# Patient Record
Sex: MALE | URBAN - METROPOLITAN AREA
[De-identification: names, ages, dates, MRNs, and addresses within clinical notes are randomized per-mention and may not be internally consistent; named-entity substitution may affect disease eponyms.]

---

## 2023-10-06 ENCOUNTER — OFFICE VISIT (OUTPATIENT)
Dept: URBAN - METROPOLITAN AREA TELEHEALTH 2 | Facility: TELEHEALTH | Age: 63
End: 2023-10-06
Payer: COMMERCIAL

## 2023-10-06 DIAGNOSIS — Z12.11 COLON CANCER SCREENING: ICD-10-CM

## 2023-10-06 DIAGNOSIS — K21.9 GERD WITHOUT ESOPHAGITIS: ICD-10-CM

## 2023-10-06 DIAGNOSIS — K52.89 (LYMPHOCYTIC) MICROSCOPIC COLITIS: ICD-10-CM

## 2023-10-06 PROCEDURE — 99203 OFFICE O/P NEW LOW 30 MIN: CPT | Performed by: INTERNAL MEDICINE

## 2023-10-06 RX ORDER — MELATONIN 3 MG
1 TABLET AT BEDTIME AS NEEDED TABLET ORAL ONCE A DAY
Status: ACTIVE | COMMUNITY
Start: 2023-10-15

## 2023-10-06 RX ORDER — SODIUM, POTASSIUM,MAG SULFATES 17.5-3.13G
354ML SOLUTION, RECONSTITUTED, ORAL ORAL
Qty: 345 MILLILITER | Refills: 0 | OUTPATIENT
Start: 2023-10-15 | End: 2023-10-16

## 2023-10-06 RX ORDER — ATORVASTATIN CALCIUM 10 MG/1
1 TABLET TABLET, FILM COATED ORAL ONCE A DAY
Status: ACTIVE | COMMUNITY
Start: 2023-10-15

## 2023-10-06 RX ORDER — AMLODIPINE BESYLATE 10 MG/1
1 TABLET TABLET ORAL ONCE A DAY
Status: ACTIVE | COMMUNITY
Start: 2023-10-15

## 2023-10-06 NOTE — HPI-TODAY'S VISIT:
This is a Telehealth OV to which patient has agreed to. Limitations of TH discussed; patient understands and agrees to proceed. Pt's at home during this virtual OV. Patient referred by Kevin Eldridge MD for CRC screening. Copy of this consult OV sent to Dr. Eldridge. 62 yo pt, originally from [facility],  who's due for CRC screening. No prior colonoscopy. He has been asx from LGI point of view: no abdominal pain, change in bowel pattern and no GIB. No FHx CC / pp / IBD / GI malignancies. Denies anorexia or weight loss. No cardiorespiratory or constitutional sxs. Past several weeks w loose, non-bloody bm;s, not clearly related to type of foods he eats. No change in diet, no use of abx's and no recent travel. Denies nocturnal  diarrhea, tenesmus, urgency nor incontinence. Intermittent GERD sxs wo dysphagia / odynophagia. CPE normal, x for HbA1c of 5.8.  No other complaints after extensive ROS.

## 2023-10-15 ENCOUNTER — DASHBOARD ENCOUNTERS (OUTPATIENT)
Age: 63
End: 2023-10-15

## 2023-10-15 ENCOUNTER — LAB OUTSIDE AN ENCOUNTER (OUTPATIENT)
Dept: URBAN - METROPOLITAN AREA TELEHEALTH 2 | Facility: TELEHEALTH | Age: 63
End: 2023-10-15

## 2023-10-15 PROBLEM — 266435005: Status: ACTIVE | Noted: 2023-10-15

## 2023-10-26 ENCOUNTER — TELEPHONE ENCOUNTER (OUTPATIENT)
Dept: URBAN - METROPOLITAN AREA CLINIC 98 | Facility: CLINIC | Age: 63
End: 2023-10-26